# Patient Record
Sex: FEMALE | Race: WHITE
[De-identification: names, ages, dates, MRNs, and addresses within clinical notes are randomized per-mention and may not be internally consistent; named-entity substitution may affect disease eponyms.]

---

## 2021-05-23 ENCOUNTER — HOSPITAL ENCOUNTER (EMERGENCY)
Dept: HOSPITAL 54 - ER | Age: 86
LOS: 1 days | Discharge: ANOTHER HOSPITAL | End: 2021-05-24
Payer: MEDICARE

## 2021-05-23 VITALS — HEIGHT: 65 IN | BODY MASS INDEX: 21.83 KG/M2 | WEIGHT: 131 LBS

## 2021-05-23 DIAGNOSIS — N17.9: ICD-10-CM

## 2021-05-23 DIAGNOSIS — F17.200: ICD-10-CM
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
## 2021-05-23 DIAGNOSIS — F03.90: ICD-10-CM

## 2021-05-23 DIAGNOSIS — I49.3: ICD-10-CM
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
## 2021-05-23 DIAGNOSIS — I25.10: ICD-10-CM

## 2021-05-23 DIAGNOSIS — I10: ICD-10-CM

## 2021-05-23 DIAGNOSIS — Z20.822: ICD-10-CM

## 2021-05-23 DIAGNOSIS — E78.5: ICD-10-CM

## 2021-05-23 DIAGNOSIS — Z86.73: ICD-10-CM

## 2021-05-23 DIAGNOSIS — J90: ICD-10-CM

## 2021-05-23 DIAGNOSIS — S72.141A: Primary | ICD-10-CM

## 2021-05-23 LAB
BASOPHILS # BLD AUTO: 0 /CMM (ref 0–0.2)
BASOPHILS NFR BLD AUTO: 0.3 % (ref 0–2)
BUN SERPL-MCNC: 25 MG/DL (ref 7–18)
CALCIUM SERPL-MCNC: 9.4 MG/DL (ref 8.5–10.1)
CHLORIDE SERPL-SCNC: 105 MMOL/L (ref 98–107)
CO2 SERPL-SCNC: 26 MMOL/L (ref 21–32)
CREAT SERPL-MCNC: 1.4 MG/DL (ref 0.6–1.3)
EOSINOPHIL NFR BLD AUTO: 0.2 % (ref 0–6)
GLUCOSE SERPL-MCNC: 126 MG/DL (ref 74–106)
HCT VFR BLD AUTO: 39 % (ref 33–45)
HGB BLD-MCNC: 12.3 G/DL (ref 11.5–14.8)
LYMPHOCYTES NFR BLD AUTO: 1 /CMM (ref 0.8–4.8)
LYMPHOCYTES NFR BLD AUTO: 12.2 % (ref 20–44)
MCHC RBC AUTO-ENTMCNC: 32 G/DL (ref 31–36)
MCV RBC AUTO: 97 FL (ref 82–100)
MONOCYTES NFR BLD AUTO: 0.8 /CMM (ref 0.1–1.3)
MONOCYTES NFR BLD AUTO: 9.6 % (ref 2–12)
NEUTROPHILS # BLD AUTO: 6.5 /CMM (ref 1.8–8.9)
NEUTROPHILS NFR BLD AUTO: 77.7 % (ref 43–81)
PLATELET # BLD AUTO: 128 /CMM (ref 150–450)
POTASSIUM SERPL-SCNC: 3.5 MMOL/L (ref 3.5–5.1)
RBC # BLD AUTO: 3.96 MIL/UL (ref 4–5.2)
SODIUM SERPL-SCNC: 144 MMOL/L (ref 136–145)
WBC NRBC COR # BLD AUTO: 8.4 K/UL (ref 4.3–11)

## 2021-05-23 PROCEDURE — 70450 CT HEAD/BRAIN W/O DYE: CPT

## 2021-05-23 PROCEDURE — 73502 X-RAY EXAM HIP UNI 2-3 VIEWS: CPT

## 2021-05-23 PROCEDURE — 80048 BASIC METABOLIC PNL TOTAL CA: CPT

## 2021-05-23 PROCEDURE — 36415 COLL VENOUS BLD VENIPUNCTURE: CPT

## 2021-05-23 PROCEDURE — 85025 COMPLETE CBC W/AUTO DIFF WBC: CPT

## 2021-05-23 PROCEDURE — 96375 TX/PRO/DX INJ NEW DRUG ADDON: CPT

## 2021-05-23 PROCEDURE — 96376 TX/PRO/DX INJ SAME DRUG ADON: CPT

## 2021-05-23 PROCEDURE — 87081 CULTURE SCREEN ONLY: CPT

## 2021-05-23 PROCEDURE — 71045 X-RAY EXAM CHEST 1 VIEW: CPT

## 2021-05-23 PROCEDURE — 85730 THROMBOPLASTIN TIME PARTIAL: CPT

## 2021-05-23 PROCEDURE — 96374 THER/PROPH/DIAG INJ IV PUSH: CPT

## 2021-05-23 PROCEDURE — 93005 ELECTROCARDIOGRAM TRACING: CPT

## 2021-05-23 PROCEDURE — 99291 CRITICAL CARE FIRST HOUR: CPT

## 2021-05-23 PROCEDURE — 87426 SARSCOV CORONAVIRUS AG IA: CPT

## 2021-05-23 PROCEDURE — C9803 HOPD COVID-19 SPEC COLLECT: HCPCS

## 2021-05-23 PROCEDURE — 51702 INSERT TEMP BLADDER CATH: CPT

## 2021-05-23 NOTE — NUR
ATTEMPTED TO CONTACT PT'S  LIZZ REGARDING TRANSFER INFORMATION 
([phone]) LEFT MESSAGE, WILL CALL BACK

## 2021-05-23 NOTE — NUR
SPOKE WITH  LIZZ. PER LIZZ, [facility] UNABLE TO 
TRANSPORT PATIENT AT THIS TIME. AUTH NUMBER PROVIDED TO SET UP TRANSPORTATION. 
#[id number]

## 2021-05-23 NOTE — NUR
SPOKE WITH PT'S  LIZZ, PER LIZZ STILL NO BED AVAILABLE AT 
[facility] AT THIS TIME, WILL CALL BACK WITH MORE INFORMATION

## 2021-05-23 NOTE — NUR
Place chance cath Fr. 16 observed sterile technique assisted by Denisha Garcia CNA ( registry ) turne patient to side gently and keep patient clean and dry .

## 2021-05-24 VITALS — SYSTOLIC BLOOD PRESSURE: 188 MMHG | DIASTOLIC BLOOD PRESSURE: 101 MMHG
